# Patient Record
Sex: MALE | Race: WHITE | Employment: FULL TIME | URBAN - NONMETROPOLITAN AREA
[De-identification: names, ages, dates, MRNs, and addresses within clinical notes are randomized per-mention and may not be internally consistent; named-entity substitution may affect disease eponyms.]

---

## 2022-11-26 ENCOUNTER — HOSPITAL ENCOUNTER (EMERGENCY)
Age: 47
Discharge: HOME | End: 2022-11-26

## 2022-11-26 ENCOUNTER — APPOINTMENT (OUTPATIENT)
Dept: INTERVENTIONAL RADIOLOGY/VASCULAR | Age: 47
End: 2022-11-26

## 2022-11-26 ENCOUNTER — HOSPITAL ENCOUNTER (INPATIENT)
Age: 47
LOS: 1 days | Discharge: LEFT BEFORE DISCHARGE | DRG: 872 | End: 2022-11-27
Attending: STUDENT IN AN ORGANIZED HEALTH CARE EDUCATION/TRAINING PROGRAM | Admitting: STUDENT IN AN ORGANIZED HEALTH CARE EDUCATION/TRAINING PROGRAM

## 2022-11-26 ENCOUNTER — APPOINTMENT (OUTPATIENT)
Dept: CT IMAGING | Age: 47
DRG: 872 | End: 2022-11-26

## 2022-11-26 VITALS
RESPIRATION RATE: 18 BRPM | TEMPERATURE: 98.1 F | HEART RATE: 88 BPM | OXYGEN SATURATION: 98 % | DIASTOLIC BLOOD PRESSURE: 108 MMHG | SYSTOLIC BLOOD PRESSURE: 175 MMHG

## 2022-11-26 DIAGNOSIS — I70.209 OCCLUSION OF COMMON FEMORAL ARTERY (HCC): Primary | ICD-10-CM

## 2022-11-26 DIAGNOSIS — I70.201 OCCLUSION OF RIGHT FEMORAL ARTERY (HCC): ICD-10-CM

## 2022-11-26 DIAGNOSIS — L02.91 ABSCESS: Primary | ICD-10-CM

## 2022-11-26 DIAGNOSIS — L02.31 LEFT BUTTOCK ABSCESS: ICD-10-CM

## 2022-11-26 LAB
ALBUMIN SERPL-MCNC: 4.1 G/DL (ref 3.5–5.1)
ALBUMIN SERPL-MCNC: 4.2 G/DL (ref 3.5–5.1)
ALP BLD-CCNC: 105 U/L (ref 38–126)
ALP BLD-CCNC: 105 U/L (ref 38–126)
ALT SERPL-CCNC: 15 U/L (ref 11–66)
ALT SERPL-CCNC: 16 U/L (ref 11–66)
ANION GAP SERPL CALCULATED.3IONS-SCNC: 12 MEQ/L (ref 8–16)
ANION GAP SERPL CALCULATED.3IONS-SCNC: 15 MEQ/L (ref 8–16)
AST SERPL-CCNC: 20 U/L (ref 5–40)
AST SERPL-CCNC: 22 U/L (ref 5–40)
BASOPHILS # BLD: 0.3 %
BASOPHILS # BLD: 0.4 %
BASOPHILS ABSOLUTE: 0 THOU/MM3 (ref 0–0.1)
BASOPHILS ABSOLUTE: 0.1 THOU/MM3 (ref 0–0.1)
BILIRUB SERPL-MCNC: 0.4 MG/DL (ref 0.3–1.2)
BILIRUB SERPL-MCNC: 0.5 MG/DL (ref 0.3–1.2)
BUN BLDV-MCNC: 19 MG/DL (ref 7–22)
BUN BLDV-MCNC: 19 MG/DL (ref 7–22)
C-REACTIVE PROTEIN: 7.12 MG/DL (ref 0–1)
CALCIUM SERPL-MCNC: 9.6 MG/DL (ref 8.5–10.5)
CALCIUM SERPL-MCNC: 9.6 MG/DL (ref 8.5–10.5)
CHLORIDE BLD-SCNC: 100 MEQ/L (ref 98–111)
CHLORIDE BLD-SCNC: 98 MEQ/L (ref 98–111)
CO2: 23 MEQ/L (ref 23–33)
CO2: 26 MEQ/L (ref 23–33)
CREAT SERPL-MCNC: 0.7 MG/DL (ref 0.4–1.2)
CREAT SERPL-MCNC: 1 MG/DL (ref 0.4–1.2)
EOSINOPHIL # BLD: 1.6 %
EOSINOPHIL # BLD: 2 %
EOSINOPHILS ABSOLUTE: 0.2 THOU/MM3 (ref 0–0.4)
EOSINOPHILS ABSOLUTE: 0.3 THOU/MM3 (ref 0–0.4)
ERYTHROCYTE [DISTWIDTH] IN BLOOD BY AUTOMATED COUNT: 13.2 % (ref 11.5–14.5)
ERYTHROCYTE [DISTWIDTH] IN BLOOD BY AUTOMATED COUNT: 13.3 % (ref 11.5–14.5)
ERYTHROCYTE [DISTWIDTH] IN BLOOD BY AUTOMATED COUNT: 44.9 FL (ref 35–45)
ERYTHROCYTE [DISTWIDTH] IN BLOOD BY AUTOMATED COUNT: 46.2 FL (ref 35–45)
GFR SERPL CREATININE-BSD FRML MDRD: > 60 ML/MIN/1.73M2
GFR SERPL CREATININE-BSD FRML MDRD: > 60 ML/MIN/1.73M2
GLUCOSE BLD-MCNC: 76 MG/DL (ref 70–108)
GLUCOSE BLD-MCNC: 99 MG/DL (ref 70–108)
HCT VFR BLD CALC: 39.3 % (ref 42–52)
HCT VFR BLD CALC: 40.7 % (ref 42–52)
HEMOGLOBIN: 13.5 GM/DL (ref 14–18)
HEMOGLOBIN: 13.8 GM/DL (ref 14–18)
IMMATURE GRANS (ABS): 0.04 THOU/MM3 (ref 0–0.07)
IMMATURE GRANS (ABS): 0.04 THOU/MM3 (ref 0–0.07)
IMMATURE GRANULOCYTES: 0.3 %
IMMATURE GRANULOCYTES: 0.3 %
LACTIC ACID: 0.8 MMOL/L (ref 0.5–2)
LYMPHOCYTES # BLD: 12.2 %
LYMPHOCYTES # BLD: 12.6 %
LYMPHOCYTES ABSOLUTE: 1.4 THOU/MM3 (ref 1–4.8)
LYMPHOCYTES ABSOLUTE: 1.8 THOU/MM3 (ref 1–4.8)
MCH RBC QN AUTO: 31.9 PG (ref 26–33)
MCH RBC QN AUTO: 32 PG (ref 26–33)
MCHC RBC AUTO-ENTMCNC: 33.9 GM/DL (ref 32.2–35.5)
MCHC RBC AUTO-ENTMCNC: 34.4 GM/DL (ref 32.2–35.5)
MCV RBC AUTO: 93.1 FL (ref 80–94)
MCV RBC AUTO: 94 FL (ref 80–94)
MONOCYTES # BLD: 8 %
MONOCYTES # BLD: 8.6 %
MONOCYTES ABSOLUTE: 0.9 THOU/MM3 (ref 0.4–1.3)
MONOCYTES ABSOLUTE: 1.2 THOU/MM3 (ref 0.4–1.3)
NUCLEATED RED BLOOD CELLS: 0 /100 WBC
NUCLEATED RED BLOOD CELLS: 0 /100 WBC
OSMOLALITY CALCULATION: 274.2 MOSMOL/KG (ref 275–300)
PLATELET # BLD: 319 THOU/MM3 (ref 130–400)
PLATELET # BLD: 328 THOU/MM3 (ref 130–400)
PMV BLD AUTO: 9.3 FL (ref 9.4–12.4)
PMV BLD AUTO: 9.5 FL (ref 9.4–12.4)
POTASSIUM REFLEX MAGNESIUM: 4.2 MEQ/L (ref 3.5–5.2)
POTASSIUM SERPL-SCNC: 4.4 MEQ/L (ref 3.5–5.2)
PROCALCITONIN: 0.07 NG/ML (ref 0.01–0.09)
RBC # BLD: 4.22 MILL/MM3 (ref 4.7–6.1)
RBC # BLD: 4.33 MILL/MM3 (ref 4.7–6.1)
SEG NEUTROPHILS: 76.1 %
SEG NEUTROPHILS: 77.6 %
SEGMENTED NEUTROPHILS ABSOLUTE COUNT: 10.6 THOU/MM3 (ref 1.8–7.7)
SEGMENTED NEUTROPHILS ABSOLUTE COUNT: 9 THOU/MM3 (ref 1.8–7.7)
SODIUM BLD-SCNC: 136 MEQ/L (ref 135–145)
SODIUM BLD-SCNC: 138 MEQ/L (ref 135–145)
TOTAL PROTEIN: 6.8 G/DL (ref 6.1–8)
TOTAL PROTEIN: 7 G/DL (ref 6.1–8)
WBC # BLD: 11.6 THOU/MM3 (ref 4.8–10.8)
WBC # BLD: 13.9 THOU/MM3 (ref 4.8–10.8)

## 2022-11-26 PROCEDURE — 96367 TX/PROPH/DG ADDL SEQ IV INF: CPT

## 2022-11-26 PROCEDURE — 84145 PROCALCITONIN (PCT): CPT

## 2022-11-26 PROCEDURE — 85025 COMPLETE CBC W/AUTO DIFF WBC: CPT

## 2022-11-26 PROCEDURE — 99285 EMERGENCY DEPT VISIT HI MDM: CPT

## 2022-11-26 PROCEDURE — 80053 COMPREHEN METABOLIC PANEL: CPT

## 2022-11-26 PROCEDURE — 96365 THER/PROPH/DIAG IV INF INIT: CPT

## 2022-11-26 PROCEDURE — 75635 CT ANGIO ABDOMINAL ARTERIES: CPT

## 2022-11-26 PROCEDURE — 96375 TX/PRO/DX INJ NEW DRUG ADDON: CPT

## 2022-11-26 PROCEDURE — 93926 LOWER EXTREMITY STUDY: CPT

## 2022-11-26 PROCEDURE — 6360000004 HC RX CONTRAST MEDICATION: Performed by: STUDENT IN AN ORGANIZED HEALTH CARE EDUCATION/TRAINING PROGRAM

## 2022-11-26 PROCEDURE — 83605 ASSAY OF LACTIC ACID: CPT

## 2022-11-26 PROCEDURE — 36415 COLL VENOUS BLD VENIPUNCTURE: CPT

## 2022-11-26 PROCEDURE — 99284 EMERGENCY DEPT VISIT MOD MDM: CPT

## 2022-11-26 PROCEDURE — 6370000000 HC RX 637 (ALT 250 FOR IP): Performed by: PHYSICIAN ASSISTANT

## 2022-11-26 PROCEDURE — 86140 C-REACTIVE PROTEIN: CPT

## 2022-11-26 RX ORDER — OXYCODONE HYDROCHLORIDE AND ACETAMINOPHEN 5; 325 MG/1; MG/1
1 TABLET ORAL ONCE
Status: COMPLETED | OUTPATIENT
Start: 2022-11-26 | End: 2022-11-26

## 2022-11-26 RX ADMIN — OXYCODONE AND ACETAMINOPHEN 1 TABLET: 5; 325 TABLET ORAL at 10:47

## 2022-11-26 RX ADMIN — IOPAMIDOL 80 ML: 755 INJECTION, SOLUTION INTRAVENOUS at 22:57

## 2022-11-26 ASSESSMENT — PAIN DESCRIPTION - FREQUENCY: FREQUENCY: INTERMITTENT

## 2022-11-26 ASSESSMENT — ENCOUNTER SYMPTOMS
TROUBLE SWALLOWING: 0
SHORTNESS OF BREATH: 0
EYE ITCHING: 0
COUGH: 0
EYE REDNESS: 0
ABDOMINAL DISTENTION: 0
NAUSEA: 0
CHEST TIGHTNESS: 0
DIARRHEA: 0
STRIDOR: 0
BLOOD IN STOOL: 0
ABDOMINAL PAIN: 0
SINUS PAIN: 0
SORE THROAT: 0
CHOKING: 0
BACK PAIN: 0
WHEEZING: 0
COLOR CHANGE: 1
BLOOD IN STOOL: 0
VOMITING: 0
EYE PAIN: 0
PHOTOPHOBIA: 0
ABDOMINAL PAIN: 0
CONSTIPATION: 0

## 2022-11-26 ASSESSMENT — PAIN DESCRIPTION - LOCATION: LOCATION: LEG;BUTTOCKS

## 2022-11-26 ASSESSMENT — PAIN SCALES - GENERAL
PAINLEVEL_OUTOF10: 8
PAINLEVEL_OUTOF10: 2
PAINLEVEL_OUTOF10: 8

## 2022-11-26 ASSESSMENT — PAIN - FUNCTIONAL ASSESSMENT
PAIN_FUNCTIONAL_ASSESSMENT: 0-10
PAIN_FUNCTIONAL_ASSESSMENT: 0-10

## 2022-11-26 ASSESSMENT — PAIN DESCRIPTION - ORIENTATION: ORIENTATION: RIGHT

## 2022-11-26 NOTE — ED TRIAGE NOTES
Pt to the ED with c/o left buttock abscess. Pt states this has been intermittent for several months but this time it is not going away.

## 2022-11-26 NOTE — ED NOTES
RN reassed patient. Pt states he needs to leave to go get tires. RN encouraged patient to stay. Pt states he will stay a little longer.       Ana Gallegos RN  11/26/22 0212

## 2022-11-26 NOTE — ED NOTES
This RN at bedside to round on pt. Pt appears to have eloped from the ED. Notified [place].       Ronaldo Dimas RN  11/26/22 9170

## 2022-11-26 NOTE — ED PROVIDER NOTES
[facility]  eMERGENCY dEPARTMENT eNCOUnter          CHIEF COMPLAINT       Chief Complaint   Patient presents with    Abscess       Nurses Notes reviewed and I agree except as noted inthe HPI. HISTORY OF PRESENT ILLNESS    Ralph Martin is a 49 y.o. male who presents to the Emergency Department for the evaluation of abscess. Patient states he has had a spot on his buttock that is been coming and going for the past several months. States it will typically last a couple of days and then he is able to get it to drain on his own using a hot shower. However, for the past week its been constant, increasing in size and more painful than previously. States he has not had any drainage from it but does notice that when he sits down, he gets some clear-white leakage which has been an ongoing problem. He also reports pain at the area with bowel movements. No fevers, chills, nausea, vomiting. He has had a couple of areas lanced in the past, always in the emergency department and has never had surgical intervention for these. Sister has history of similar areas and patient reports is of been present in the abdomen, neck, and buttocks in the past.  He has not tried anything for home treatment. He also notes that he has had issues with right leg pain and color change with ambulation for several months. States he has had times where the foot feels cool. He has a photo from 1 it was bad and would like this evaluated while he is here as he does not have a PCP. The HPI was provided by the patient. REVIEW OF SYSTEMS     Review of Systems   Constitutional:  Negative for chills and fever. Gastrointestinal:  Negative for abdominal pain and blood in stool. Musculoskeletal:  Positive for arthralgias. Skin:  Positive for color change and wound. All other systems reviewed and are negative. PAST MEDICAL HISTORY    has no past medical history on file.     SURGICAL HISTORY      has no past surgical history on file. CURRENT MEDICATIONS     There are no discharge medications for this patient. ALLERGIES     has No Known Allergies. FAMILY HISTORY     has no family status information on file. family history is not on file. SOCIAL HISTORY          PHYSICAL EXAM     INITIAL VITALS:  oral temperature is 98.1 °F (36.7 °C). His blood pressure is 175/108 (abnormal) and his pulse is 88. His respiration is 18 and oxygen saturation is 98%. Physical Exam  Vitals and nursing note reviewed. HENT:      Head: Normocephalic and atraumatic. Eyes:      Conjunctiva/sclera: Conjunctivae normal.   Cardiovascular:      Rate and Rhythm: Normal rate. Comments: Right DP/PT pulses not palpable. Pulmonary:      Effort: Pulmonary effort is normal. No respiratory distress. Abdominal:      Palpations: Abdomen is soft. Tenderness: There is no abdominal tenderness. Genitourinary:     Comments: There is an 8 x 9 area of erythema to the medial left buttock which approaches but does not appear to involve the anus. There is approximately 6 to 7 cm diameter area of induration associated with this with some central fluctuance and warmth/tenderness throughout. Musculoskeletal:         General: Normal range of motion. Cervical back: Normal range of motion. Comments: Right foot is very mildly cool without any evidence of cyanosis and with capillary refill less than 3 seconds. Sensation is intact throughout as is range of motion. No tenderness. Patient has steady gait on ambulation. Skin:     General: Skin is warm and dry. Neurological:      Mental Status: He is alert.          DIFFERENTIAL DIAGNOSIS:   Differential diagnoses are discussed    DIAGNOSTIC RESULTS     EKG: All EKG's are interpreted by the Emergency Department Physician who either signs or Co-signsthis chart in the absence of a cardiologist.          RADIOLOGY: non-plain film images(s) such as CT, Ultrasound and MRI are read by retrograde flow of the profundal artery supplying the distal arterial system. I tried contacting the patient twice at [address] but there was no answer and voicemail was not set up. I did call his emergency contact, Susanne Willis, who was able to get in touch with him in the patient called back and expressed that he was going to come back in for further evaluation. Oncoming provider notified of the above results and plan. CRITICAL CARE:   None    CONSULTS:  None    PROCEDURES:  None    FINAL IMPRESSION      1. Occlusion of common femoral artery ([address]    2. Left buttock abscess          DISPOSITION/PLAN   Patient eloped    PATIENT REFERRED TO:  No follow-up provider specified. DISCHARGEMEDICATIONS:  There are no discharge medications for this patient.       (Please note that portions of this note were completedwith a voice recognition program.  Efforts were made to edit the dictations but occasionally words are mis-transcribed.)       Kika Irizarry PA-C  11/26/22 4028

## 2022-11-27 VITALS
BODY MASS INDEX: 16.97 KG/M2 | HEART RATE: 108 BPM | WEIGHT: 146.7 LBS | SYSTOLIC BLOOD PRESSURE: 142 MMHG | HEIGHT: 78 IN | OXYGEN SATURATION: 98 % | DIASTOLIC BLOOD PRESSURE: 93 MMHG | TEMPERATURE: 98.7 F | RESPIRATION RATE: 20 BRPM

## 2022-11-27 PROBLEM — L02.91 ABSCESS: Status: ACTIVE | Noted: 2022-11-27

## 2022-11-27 LAB
ANION GAP SERPL CALCULATED.3IONS-SCNC: 13 MEQ/L (ref 8–16)
APTT: 28.9 SECONDS (ref 22–38)
AVERAGE GLUCOSE: 108 MG/DL (ref 70–126)
BASOPHILS # BLD: 0.4 %
BASOPHILS ABSOLUTE: 0.1 THOU/MM3 (ref 0–0.1)
BUN BLDV-MCNC: 19 MG/DL (ref 7–22)
C3 COMPLEMENT: 148 MG/DL (ref 90–180)
CALCIUM SERPL-MCNC: 9 MG/DL (ref 8.5–10.5)
CHLORIDE BLD-SCNC: 100 MEQ/L (ref 98–111)
CO2: 23 MEQ/L (ref 23–33)
COMPLEMENT C4: 24 MG/DL (ref 10–40)
CREAT SERPL-MCNC: 0.8 MG/DL (ref 0.4–1.2)
EKG ATRIAL RATE: 93 BPM
EKG P AXIS: 86 DEGREES
EKG P-R INTERVAL: 150 MS
EKG Q-T INTERVAL: 336 MS
EKG QRS DURATION: 88 MS
EKG QTC CALCULATION (BAZETT): 417 MS
EKG R AXIS: -64 DEGREES
EKG T AXIS: 79 DEGREES
EKG VENTRICULAR RATE: 93 BPM
EOSINOPHIL # BLD: 2.4 %
EOSINOPHILS ABSOLUTE: 0.3 THOU/MM3 (ref 0–0.4)
ERYTHROCYTE [DISTWIDTH] IN BLOOD BY AUTOMATED COUNT: 13.2 % (ref 11.5–14.5)
ERYTHROCYTE [DISTWIDTH] IN BLOOD BY AUTOMATED COUNT: 45.8 FL (ref 35–45)
GFR SERPL CREATININE-BSD FRML MDRD: > 60 ML/MIN/1.73M2
GLUCOSE BLD-MCNC: 102 MG/DL (ref 70–108)
HBA1C MFR BLD: 5.6 % (ref 4.4–6.4)
HCT VFR BLD CALC: 37.9 % (ref 42–52)
HEMOGLOBIN: 13 GM/DL (ref 14–18)
IMMATURE GRANS (ABS): 0.05 THOU/MM3 (ref 0–0.07)
IMMATURE GRANULOCYTES: 0.4 %
INR BLD: 1.1 (ref 0.85–1.13)
LACTIC ACID: 1.1 MMOL/L (ref 0.5–2)
LYMPHOCYTES # BLD: 14.7 %
LYMPHOCYTES ABSOLUTE: 1.9 THOU/MM3 (ref 1–4.8)
MCH RBC QN AUTO: 32.5 PG (ref 26–33)
MCHC RBC AUTO-ENTMCNC: 34.3 GM/DL (ref 32.2–35.5)
MCV RBC AUTO: 94.8 FL (ref 80–94)
MONOCYTES # BLD: 9.1 %
MONOCYTES ABSOLUTE: 1.2 THOU/MM3 (ref 0.4–1.3)
NUCLEATED RED BLOOD CELLS: 0 /100 WBC
PLATELET # BLD: 320 THOU/MM3 (ref 130–400)
PMV BLD AUTO: 9.6 FL (ref 9.4–12.4)
POTASSIUM SERPL-SCNC: 3.8 MEQ/L (ref 3.5–5.2)
PROCALCITONIN: 0.11 NG/ML (ref 0.01–0.09)
PROCALCITONIN: 0.11 NG/ML (ref 0.01–0.09)
RBC # BLD: 4 MILL/MM3 (ref 4.7–6.1)
RHEUMATOID FACTOR: 12 IU/ML (ref 0–13)
SEG NEUTROPHILS: 73 %
SEGMENTED NEUTROPHILS ABSOLUTE COUNT: 9.6 THOU/MM3 (ref 1.8–7.7)
SODIUM BLD-SCNC: 136 MEQ/L (ref 135–145)
WBC # BLD: 13.1 THOU/MM3 (ref 4.8–10.8)

## 2022-11-27 PROCEDURE — 2580000003 HC RX 258: Performed by: EMERGENCY MEDICINE

## 2022-11-27 PROCEDURE — 85025 COMPLETE CBC W/AUTO DIFF WBC: CPT

## 2022-11-27 PROCEDURE — 93005 ELECTROCARDIOGRAM TRACING: CPT | Performed by: STUDENT IN AN ORGANIZED HEALTH CARE EDUCATION/TRAINING PROGRAM

## 2022-11-27 PROCEDURE — 87075 CULTR BACTERIA EXCEPT BLOOD: CPT

## 2022-11-27 PROCEDURE — 93010 ELECTROCARDIOGRAM REPORT: CPT | Performed by: INTERNAL MEDICINE

## 2022-11-27 PROCEDURE — 83605 ASSAY OF LACTIC ACID: CPT

## 2022-11-27 PROCEDURE — 36415 COLL VENOUS BLD VENIPUNCTURE: CPT

## 2022-11-27 PROCEDURE — 86038 ANTINUCLEAR ANTIBODIES: CPT

## 2022-11-27 PROCEDURE — 84145 PROCALCITONIN (PCT): CPT

## 2022-11-27 PROCEDURE — 87070 CULTURE OTHR SPECIMN AEROBIC: CPT

## 2022-11-27 PROCEDURE — 87077 CULTURE AEROBIC IDENTIFY: CPT

## 2022-11-27 PROCEDURE — 96365 THER/PROPH/DIAG IV INF INIT: CPT

## 2022-11-27 PROCEDURE — 87040 BLOOD CULTURE FOR BACTERIA: CPT

## 2022-11-27 PROCEDURE — 85610 PROTHROMBIN TIME: CPT

## 2022-11-27 PROCEDURE — 2500000003 HC RX 250 WO HCPCS: Performed by: EMERGENCY MEDICINE

## 2022-11-27 PROCEDURE — 99223 1ST HOSP IP/OBS HIGH 75: CPT | Performed by: STUDENT IN AN ORGANIZED HEALTH CARE EDUCATION/TRAINING PROGRAM

## 2022-11-27 PROCEDURE — 86430 RHEUMATOID FACTOR TEST QUAL: CPT

## 2022-11-27 PROCEDURE — 1200000000 HC SEMI PRIVATE

## 2022-11-27 PROCEDURE — 80048 BASIC METABOLIC PNL TOTAL CA: CPT

## 2022-11-27 PROCEDURE — 6360000002 HC RX W HCPCS: Performed by: EMERGENCY MEDICINE

## 2022-11-27 PROCEDURE — 86255 FLUORESCENT ANTIBODY SCREEN: CPT

## 2022-11-27 PROCEDURE — 83036 HEMOGLOBIN GLYCOSYLATED A1C: CPT

## 2022-11-27 PROCEDURE — 96367 TX/PROPH/DG ADDL SEQ IV INF: CPT

## 2022-11-27 PROCEDURE — 86160 COMPLEMENT ANTIGEN: CPT

## 2022-11-27 PROCEDURE — 85730 THROMBOPLASTIN TIME PARTIAL: CPT

## 2022-11-27 PROCEDURE — 87205 SMEAR GRAM STAIN: CPT

## 2022-11-27 PROCEDURE — 6360000002 HC RX W HCPCS

## 2022-11-27 PROCEDURE — 96375 TX/PRO/DX INJ NEW DRUG ADDON: CPT

## 2022-11-27 PROCEDURE — 2580000003 HC RX 258: Performed by: STUDENT IN AN ORGANIZED HEALTH CARE EDUCATION/TRAINING PROGRAM

## 2022-11-27 RX ORDER — FENTANYL CITRATE 50 UG/ML
INJECTION, SOLUTION INTRAMUSCULAR; INTRAVENOUS
Status: COMPLETED
Start: 2022-11-27 | End: 2022-11-27

## 2022-11-27 RX ORDER — SODIUM CHLORIDE 0.9 % (FLUSH) 0.9 %
5-40 SYRINGE (ML) INJECTION EVERY 12 HOURS SCHEDULED
Status: DISCONTINUED | OUTPATIENT
Start: 2022-11-27 | End: 2022-11-27 | Stop reason: SDUPTHER

## 2022-11-27 RX ORDER — SODIUM CHLORIDE 0.9 % (FLUSH) 0.9 %
5-40 SYRINGE (ML) INJECTION PRN
Status: DISCONTINUED | OUTPATIENT
Start: 2022-11-27 | End: 2022-11-27 | Stop reason: HOSPADM

## 2022-11-27 RX ORDER — HEPARIN SODIUM 10000 [USP'U]/100ML
5-30 INJECTION, SOLUTION INTRAVENOUS CONTINUOUS
Status: DISCONTINUED | OUTPATIENT
Start: 2022-11-27 | End: 2022-11-27 | Stop reason: HOSPADM

## 2022-11-27 RX ORDER — ACETAMINOPHEN 650 MG/1
650 SUPPOSITORY RECTAL EVERY 6 HOURS PRN
Status: DISCONTINUED | OUTPATIENT
Start: 2022-11-27 | End: 2022-11-27 | Stop reason: HOSPADM

## 2022-11-27 RX ORDER — FENTANYL CITRATE 50 UG/ML
100 INJECTION, SOLUTION INTRAMUSCULAR; INTRAVENOUS ONCE
Status: COMPLETED | OUTPATIENT
Start: 2022-11-27 | End: 2022-11-27

## 2022-11-27 RX ORDER — ONDANSETRON 2 MG/ML
4 INJECTION INTRAMUSCULAR; INTRAVENOUS EVERY 6 HOURS PRN
Status: DISCONTINUED | OUTPATIENT
Start: 2022-11-27 | End: 2022-11-27 | Stop reason: HOSPADM

## 2022-11-27 RX ORDER — HEPARIN SODIUM 1000 [USP'U]/ML
80 INJECTION, SOLUTION INTRAVENOUS; SUBCUTANEOUS PRN
Status: DISCONTINUED | OUTPATIENT
Start: 2022-11-27 | End: 2022-11-27 | Stop reason: HOSPADM

## 2022-11-27 RX ORDER — SODIUM CHLORIDE 9 MG/ML
INJECTION, SOLUTION INTRAVENOUS PRN
Status: DISCONTINUED | OUTPATIENT
Start: 2022-11-27 | End: 2022-11-27 | Stop reason: HOSPADM

## 2022-11-27 RX ORDER — 0.9 % SODIUM CHLORIDE 0.9 %
30 INTRAVENOUS SOLUTION INTRAVENOUS ONCE
Status: COMPLETED | OUTPATIENT
Start: 2022-11-27 | End: 2022-11-27

## 2022-11-27 RX ORDER — POLYETHYLENE GLYCOL 3350 17 G
2 POWDER IN PACKET (EA) ORAL
Status: DISCONTINUED | OUTPATIENT
Start: 2022-11-27 | End: 2022-11-27 | Stop reason: HOSPADM

## 2022-11-27 RX ORDER — HEPARIN SODIUM 1000 [USP'U]/ML
80 INJECTION, SOLUTION INTRAVENOUS; SUBCUTANEOUS ONCE
Status: COMPLETED | OUTPATIENT
Start: 2022-11-27 | End: 2022-11-27

## 2022-11-27 RX ORDER — ACETAMINOPHEN 325 MG/1
650 TABLET ORAL EVERY 6 HOURS PRN
Status: DISCONTINUED | OUTPATIENT
Start: 2022-11-27 | End: 2022-11-27 | Stop reason: HOSPADM

## 2022-11-27 RX ORDER — POLYETHYLENE GLYCOL 3350 17 G/17G
17 POWDER, FOR SOLUTION ORAL DAILY PRN
Status: DISCONTINUED | OUTPATIENT
Start: 2022-11-27 | End: 2022-11-27 | Stop reason: HOSPADM

## 2022-11-27 RX ORDER — NICOTINE 21 MG/24HR
1 PATCH, TRANSDERMAL 24 HOURS TRANSDERMAL DAILY
Status: DISCONTINUED | OUTPATIENT
Start: 2022-11-27 | End: 2022-11-27 | Stop reason: HOSPADM

## 2022-11-27 RX ORDER — SODIUM CHLORIDE 9 MG/ML
INJECTION, SOLUTION INTRAVENOUS PRN
Status: DISCONTINUED | OUTPATIENT
Start: 2022-11-27 | End: 2022-11-27 | Stop reason: SDUPTHER

## 2022-11-27 RX ORDER — HEPARIN SODIUM 1000 [USP'U]/ML
40 INJECTION, SOLUTION INTRAVENOUS; SUBCUTANEOUS PRN
Status: DISCONTINUED | OUTPATIENT
Start: 2022-11-27 | End: 2022-11-27 | Stop reason: HOSPADM

## 2022-11-27 RX ORDER — LIDOCAINE HYDROCHLORIDE 10 MG/ML
5 INJECTION, SOLUTION EPIDURAL; INFILTRATION; INTRACAUDAL; PERINEURAL ONCE
Status: COMPLETED | OUTPATIENT
Start: 2022-11-27 | End: 2022-11-27

## 2022-11-27 RX ORDER — ONDANSETRON 4 MG/1
4 TABLET, ORALLY DISINTEGRATING ORAL EVERY 8 HOURS PRN
Status: DISCONTINUED | OUTPATIENT
Start: 2022-11-27 | End: 2022-11-27 | Stop reason: HOSPADM

## 2022-11-27 RX ORDER — 0.9 % SODIUM CHLORIDE 0.9 %
30 INTRAVENOUS SOLUTION INTRAVENOUS ONCE
Status: DISCONTINUED | OUTPATIENT
Start: 2022-11-27 | End: 2022-11-27

## 2022-11-27 RX ORDER — SODIUM CHLORIDE 0.9 % (FLUSH) 0.9 %
5-40 SYRINGE (ML) INJECTION PRN
Status: DISCONTINUED | OUTPATIENT
Start: 2022-11-27 | End: 2022-11-27 | Stop reason: SDUPTHER

## 2022-11-27 RX ORDER — SODIUM CHLORIDE 0.9 % (FLUSH) 0.9 %
5-40 SYRINGE (ML) INJECTION EVERY 12 HOURS SCHEDULED
Status: DISCONTINUED | OUTPATIENT
Start: 2022-11-27 | End: 2022-11-27 | Stop reason: HOSPADM

## 2022-11-27 RX ADMIN — HEPARIN SODIUM 5180 UNITS: 1000 INJECTION, SOLUTION INTRAVENOUS; SUBCUTANEOUS at 04:27

## 2022-11-27 RX ADMIN — VANCOMYCIN HYDROCHLORIDE 1250 MG: 5 INJECTION, POWDER, LYOPHILIZED, FOR SOLUTION INTRAVENOUS at 03:36

## 2022-11-27 RX ADMIN — FENTANYL CITRATE 100 MCG: 50 INJECTION, SOLUTION INTRAMUSCULAR; INTRAVENOUS at 02:40

## 2022-11-27 RX ADMIN — FENTANYL CITRATE 100 MCG: 0.05 INJECTION, SOLUTION INTRAMUSCULAR; INTRAVENOUS at 02:40

## 2022-11-27 RX ADMIN — SODIUM CHLORIDE 1000 ML: 9 INJECTION, SOLUTION INTRAVENOUS at 05:39

## 2022-11-27 RX ADMIN — LIDOCAINE HYDROCHLORIDE 5 ML: 10 INJECTION, SOLUTION EPIDURAL; INFILTRATION; INTRACAUDAL; PERINEURAL at 03:01

## 2022-11-27 RX ADMIN — CEFTRIAXONE SODIUM 1000 MG: 1 INJECTION, POWDER, FOR SOLUTION INTRAMUSCULAR; INTRAVENOUS at 03:02

## 2022-11-27 RX ADMIN — HEPARIN SODIUM 18 UNITS/KG/HR: 10000 INJECTION, SOLUTION INTRAVENOUS at 04:33

## 2022-11-27 NOTE — ED NOTES
ED to inpatient nurses report    Chief Complaint   Patient presents with    Abscess      Present to ED from Home  LOC: alert and orientated to name, place, date  Vital signs   Vitals:    11/27/22 0108 11/27/22 0137 11/27/22 0208 11/27/22 0308   BP: (!) 127/92 (!) 142/99 (!) 131/104 (!) 157/107   Pulse: (!) 102 (!) 101 (!) 102 100   Resp: 19 17 17 15   Temp:       TempSrc:       SpO2: 96% 96% 96% 97%   Weight:       Height:          Oxygen Baseline RA    Current needs required Pain control Bipap/Cpap No  LDAs:   Peripheral IV 11/26/22 Right Forearm (Active)   Site Assessment Clean, dry & intact 11/26/22 2253   Line Status Blood return noted; Flushed 11/26/22 2253   Phlebitis Assessment No symptoms 11/26/22 2253   Infiltration Assessment 0 11/26/22 2253   Dressing Status Clean, dry & intact 11/26/22 2253   Dressing Type Transparent 11/26/22 2253       Peripheral IV 11/27/22 Left Forearm (Active)   Site Assessment Clean, dry & intact 11/27/22 0352   Line Status Blood return noted;Specimen collected; Flushed 11/27/22 0352   Phlebitis Assessment No symptoms 11/27/22 0352   Infiltration Assessment 0 11/27/22 0352   Dressing Status New dressing applied 11/27/22 0352   Dressing Type Transparent 11/27/22 0352     Mobility: Independent  Pending ED orders: No  Present condition: Stable    C-SSRS Risk of Suicide: No Risk  Swallow Screening    Preferred Language: [place]     Electronically signed by Martha Jiménez RN on 11/27/2022 at James Crawford RN  11/27/22 9308

## 2022-11-27 NOTE — ED TRIAGE NOTES
Pt states he is coming back after leaving because the hospital took to long. He stated that his father called him to tell him that he had some abnormal test and needed to be admitted. Pt complains of a abscess on his left buttocks and pain down his right leg. Pt stated he has had this pain for over two years. Pt admits to using Meth earlier and mariajuana. Pt has an abscess that is the size of a softball on left buttocks, red, painful and warm to the touch.

## 2022-11-27 NOTE — PROGRESS NOTES
[address] [facility] Pharmacokinetic Monitoring Service - Vancomycin     Luis Eduardo Crenshaw is a 49 y.o. male starting on vancomycin therapy for SSTI. Pharmacy consulted by Dr Darryl Nelson for monitoring and adjustment. Target Concentration: Goal trough of 10-15 mg/L and AUC/GIA <500 mg*hr/L    Additional Antimicrobials: none    Pertinent Laboratory Values: Wt Readings from Last 1 Encounters:   11/27/22 146 lb 11.2 oz (66.5 kg)     Temp Readings from Last 1 Encounters:   11/27/22 98.7 °F (37.1 °C) (Oral)     Estimated Creatinine Clearance: 85 mL/min (based on SCr of 1 mg/dL). Recent Labs     11/26/22  0952 11/26/22  2215   CREATININE 0.7 1.0   WBC 11.6* 13.9*     Procalcitonin: 0.07    Pertinent Cultures:  Culture Date Source Results   11/27/22 Blood x 2    11/27/22 Abscess culture    MRSA Nasal Swab: N/A. Non-respiratory infection.     Plan:  Dosing recommendations based on Bayesian software  Start vancomycin 1250mg x 1 in ED at 0336, then 750mg q12h  Anticipated AUC of 433 and trough concentration of 13.6 at steady state  Renal labs as indicated   Vancomycin concentration not ordered yet   Pharmacy will continue to monitor patient and adjust therapy as indicated    Thank you for the consult,  IRAIS Patel [facility]  11/27/2022 5:25 AM

## 2022-11-27 NOTE — ED NOTES
Per Dr. Juan José Rosa.  Pt to have heparin started on [address] [facility], [address] [place] [address]  11/27/22 8949

## 2022-11-27 NOTE — PLAN OF CARE
Problem: ABCDS Injury Assessment  Goal: Absence of physical injury  Outcome: Progressing     Problem: Safety - Adult  Goal: Free from fall injury  Outcome: Progressing     Problem: Risk for Elopement  Goal: Patient will not exit the unit/facility without proper excort  Outcome: Progressing  Flowsheets (Taken 11/27/2022 0500)  Nursing Interventions for Elopement Risk:   Communicate/escalate to charge nurse the risk of elopement   Reduce environmental triggers     Problem: Pain  Goal: Verbalizes/displays adequate comfort level or baseline comfort level  Outcome: Progressing   Care plan reviewed with patient. Patient verbalizes understanding of the plan of care and contribute to goal setting.

## 2022-11-27 NOTE — ED NOTES
Pt transferred to [facility] room 020 nurse informed prior to bringing pt up.       Farshad Garsia  11/27/22 9748

## 2022-11-27 NOTE — ED PROVIDER NOTES
[address] [facility] [address] [address] EMERGENCY DEPT  ED Attending Physician Attestation     I performed a history and physical examination of the patient and discussed management with the Resident. I reviewed the Resident's note and agree with the documented findings and plan of care. Any areas of disagreement are noted on the chart. I was personally present for the key portions of any procedures and have documented in the chart those procedures where I was not present during the key portions. I have reviewed the emergency nurses triage note and agree with the chief complaint, past medical history, past surgical history, allergies, medications, social and family history as documented unless otherwise noted below. Buttock abscess plus lower extremity arterial occlusion with reconstitution. No signs or symptoms of limb ischemia on examination. Reconstitution noted on multiple imaging modalities. CT was negative for evidence of perirectal involvement of the abscess. I&D was performed at the bedside with large volume purulent drainage. Rocephin and vancomycin ordered. The presenting tachycardia was thought to be related to methamphetamine use. The patient was afebrile. The leukocytosis was later noted and the patient was started on broad-spectrum antibiotics. Vascular surgery was contacted with recommendation for initiation of heparin and admission for evaluation in the a.m. The patient agreed to admission.     Emeka Villegas MD,   Attending Emergency Physician       Emeka Villegas MD  11/27/22 6081

## 2022-11-27 NOTE — PROGRESS NOTES
6090 - patient sets off bed alarm with change of position. Patient is very agitated and demanding to go out to smoke. Updated Dr Lissett Brian the following statement \"Patient wants to leave. Does not want nicotine patch, wants to go out and smoke. If we won't let him smoke then he wants to remove the IVs and leave because he can't be here all day and not smoke, his words. \" Patient is cursing loudly due to not being able to leave his room. Awaiting response from Dr Lissett Brian or Roc Jose PA-C.    6783 - notified patient of Prema Nicely decision to not allow him to smoke. Patient states \"then I am leaving. \" This nurse explained the seriousness of patient leaving against medical advice and that it could possibly lead to the clot travelling to major organs and cause his death. Patient states \"I am aware of all that but I just got to go smoke. I will go somewhere else to get a second opinion. \" Removed both forearm Ivs and secured with tape. Patient ambulatory to elevator per self at this time. 6930 - notified Garettjannie Nicely of patient departure from floor. Notified supervisor as well.

## 2022-11-27 NOTE — ED NOTES
Pt transferred to [place] [address] room 020 nurse informed prior to bringing Pt up.       Arthur Kruger  11/27/22 2646

## 2022-11-27 NOTE — H&P
Hospitalist - History & Physical      Patient: Fredy Lawson    Unit/Bed:40/040A  YOB: 1975  MRN: [id number]   Acct: [de-identified]   PCP: No primary care provider on file. Date of Service: Pt seen/examined on 11/27/22  and Admitted to Inpatient with expected LOS greater than two midnights due to medical therapy. Chief Complaint:  Abscess and RLE pain. Assessment and Plan:-  Possible sepsis secondary to L buttock abscess: He met SIRS 2/4 criteria at ED with elevated WBC of 13.9 and tachycardia , which could be related to meth use, but considering his abscess, cannot rule out sepsis. Thus will treat with 30 cc/kg bolus. Blood cx sent. On vancomycin. Will follow up cultures from abscess sent by ED. Will check HgbA1c as he reports being prone to abscesses. CRP of 7.12. Concern for right limb ischemia: as his RLE becomes purple at times, with numbness/tingling, has resting claudication pain, unable to palpate DP and TP pulses. R foot is cold on palpation. Fortunately, even though his CT showed occlusion of multiple vessels especially complete occlusion of R common femoral artery, complete occlusion of R vertebral artery and tibioperoneal trunk. He had bilateral arterial collaterals in both calves (greater on the right side), so making it chronic process, he reports his symptoms have been ongoing since 2016. Given the degree of multi focal central and peripheral vascular stenosis and occlusions, without significant atherosclerotic disease, concerning for vasculitis/collagen vascular disease. He has marfanoid features (height of 6'7 and BMI of 16.10), making collagen vascular disease a possibility (consider obtaining TTE, especially if blood cx result positive). Vascular surgery were consulted by ED, started on heparin gtt per their recommendation. Will order autoimmune workup to assess for vasculitis as well (MEHRDAD, ANCA, C3 and C4). Serial neurovascular checks.    Tobacco use: nicotine patch and lozenges PRN. Polysubstance abuse (especially meth, denies IV drug use): will get Utox. SW consult and Addiction services consult for rehab placement. Denies alcohol use, but placed on CIWA in case. Also placed him on COWS. Did not order any medications, can be re-assessed based on scores. History Of Present Illness:    Mr. Yulia Hines is a 49 y.o. male with polysubstance abuse (except IV/heroine) who presents at request of his father for treatment of his abscess and RLE pain. He has been struggling with buttock mass on left that comes and goes for past several months. It will last couple of days and then he can drain it by himself by taking hot shower. He came in because since one week, it has been consistently present and increasing in size with more pain relatively. When he sits up, it makes it leak. No fever/chills. He has no pain with bm. No nausea/vomiting. His sister experiences similar issues where she had abscesses at abdomen, neck and buttocks. He also has been having R leg pain and color change upon waking for several months. It becomes purple in color. He has numbness/tingling at the top aspect of his right foot. He has to wear compression stockings while walking or he cant walk. R foot stays cold all the time. He has cramps from time to time. Cramps start from back of knee and goes down to foot. It goes on and on and very painful that he is unable to ambulate. He has been like this since 2016. In April, he kicked his right toe in sleep and broke his toe. He was at ER on 11/26 am for the same issue where he underwent vascular arterial ultrasound which showed occlusion of the distal R common femoral artery just proximal to bifurcation. Just beyond the bifurcation there is retrograde flow within the profunda femoris artery which appears to provide collateral flow to the superficial femoral artery proximally.  Patient eloped because things were taking to long and he wanted to do meth and smoke cigarettes. He re-presented at night as his father made him come to ER. CT was done at ED which showed occlusion of multiple vessels especially complete occlusion of R common femoral artery, complete occlusion of R vertebral artery and tibioperoneal trunk with bilateral arterial collaterals in both calves (greater on the right side). Given the degree of multi focal central and peripheral vascular stenosis and occlusions, without significant atherosclerotic disease, concerning for vasculitis/collagen vascular disease. Also noted to have left buttock abscess up to 1.9 cm and mild inguinal LAD. Otherwise, no chest pain, nausea, vomiting, SOB, fever, issues with bm or urination. He underwent I&D at ED for R buttock abscess. Vascular surgery were consulted given his CT findings, they recommended heparin gtt. Hospitalist Team were contacted for admission. Past Medical History:    No past medical history on file. Past Surgical History:    No past surgical history on file. Home Medications:   No current facility-administered medications on file prior to encounter. No current outpatient medications on file prior to encounter. Allergies:    Patient has no known allergies. Social History:    reports that he has been smoking cigarettes. He has a 50.00 pack-year smoking history. He uses smokeless tobacco. He reports current alcohol use. He reports current drug use. Drugs: Cocaine, Marijuana (Fransico Moots), and Methamphetamines (Crystal Meth). Family History:   No family history on file. Diet:  No diet orders on file    Review of systems:   Pertinent positives as noted in the HPI. All other systems reviewed and negative. PHYSICAL EXAM:  BP (!) 157/107   Pulse 100   Temp 98.1 °F (36.7 °C) (Oral)   Resp 15   Ht 6' 7\" (2.007 m)   Wt 142 lb 14.4 oz (64.8 kg)   SpO2 97%   BMI 16.10 kg/m²   General appearance: No apparent distress, appears stated age and cooperative.   HEENT: Normal cephalic, atraumatic without obvious deformity. Pupils equal, round, and reactive to light. Extra ocular muscles intact. Conjunctivae/corneas clear. Poor dentition  Neck: Supple, with full range of motion. No jugular venous distention. Trachea midline. Small nodules at back of neck, which was non-tender (chronic per patient). Respiratory:  Normal respiratory effort. Clear to auscultation, bilaterally without Rales/Wheezes/Rhonchi. Cardiovascular: Regular rate and rhythm with normal S1/S2 without murmurs, rubs or gallops. Abdomen: Soft, non-tender, non-distended with normal bowel sounds. Musculoskeletal:  No clubbing, cyanosis or edema bilaterally. Skin: Skin color, texture, turgor normal.  Erythema/warmth present at left buttock, dressing in place C/D/I. Initial picture of abscess prior to drainage is attached below. Neurologic:  Neurovascularly intact without any focal motor deficits. Cranial nerves: II-XII intact, grossly non-focal. Loss of sensation at dorsal aspect of right toe. Psychiatric: Alert and oriented. Capillary Refill: poor capillary refill on R toe. Peripheral Pulses: unable to appreciate DP and TP on R. R foot cold on palpation. Labs:   Recent Labs     11/26/22 0952 11/26/22 2215   WBC 11.6* 13.9*   HGB 13.8* 13.5*   HCT 40.7* 39.3*    328     Recent Labs     11/26/22 0952 11/26/22 2215    138   K 4.2 4.4   CL 98 100   CO2 26 23   BUN 19 19   CREATININE 0.7 1.0   CALCIUM 9.6 9.6     Recent Labs     11/26/22 0952 11/26/22 2215   AST 20 22   ALT 15 16   BILITOT 0.4 0.5   ALKPHOS 105 105     No results for input(s): INR in the last 72 hours. No results for input(s): Gillian Alexander in the last 72 hours. Urinalysis:    No results found for: Ward Castles, BACTERIA, RBCUA, BLOODU, SPECGRAV, GLUCOSEU    Radiology:   CTA ABDOMINAL AORTA W BILAT RUNOFF W WO CONTRAST   Final Result   Impression:   1.  Complete occlusion right common femoral artery, with distal reconstitution of flow just above the bifurcation. Extensive local    arterial collaterals. 2. Complete occlusion right vertebral artery and tibioperoneal trunk, with    distal reconstitution of flow in the anterior and posterior tibial and    peroneal arteries. Extensive arterial collaterals in the calf. 3. Focal moderate/severe stenosis left popliteal artery, with    collateralization of arterial flow. 4. Stenosis proximal left anterior tibial artery. 5. Bilateral 3 vessel runoff. Bilateral arterial collaterals in both    calves, greater on the right side. 6. Moderate short segment SMA stenosis, with associated poststenotic    dilatation. 7. Complete occlusion short segment proximal celiac artery, with distal    reconstitution of flow via collaterals. 8. Given the degree of multi-focal central and peripheral vascular    stenosis and occlusions, without significant atherosclerotic disease. Findings raise possibility of vasculitis/collagen vascular disease. 9. Left buttock abscess up to 1.9 cm. Mild left inguinal lymphadenopathy. This document has been electronically signed by: Mir Fish MD on    11/27/2022 12:06 AM      All CTs at this facility use dose modulation techniques and iterative    reconstructions, and/or weight-based dosing   when appropriate to reduce radiation to a low as reasonably achievable. 3D Post-processing was performed on this study. CTA ABDOMINAL AORTA W BILAT RUNOFF W WO CONTRAST    Result Date: 11/27/2022  ADDENDUM #1 Addendum for dictation error correction/clarification. - Erroneous sentence: \"Complete occlusion right vertebral artery and tibioperoneal trunk, with distal reconstitution of flow in the anterior and posterior tibial and peroneal arteries. \" - Corrected sentence: Complete occlusion right popliteal artery and tibioperoneal trunk, with distal reconstitution of flow in the anterior and posterior tibial and peroneal arteries.  This document has been electronically signed by: Jose C Egan MD on 11/27/2022 12:10 AM ADDENDUM #2 This report was discussed with Delmis Garcia rn on Nov 27, 2022 00:11:00 EST. This document has been electronically signed by: Chio Trinh on 11/27/2022 12:11 AM ORIGINAL REPORTCTA Runoff with contrast Indication: Right SFA occlusion. Technique: CTA Runoff with intravenous contrast. Coronal and sagittal reformations. Comparison: None Findings: Motion artifacts, lowering sensitivity. The abdominal aorta is normal in caliber, with trace atherosclerotic plaques. Short segment occlusion of the proximal celiac artery, with distal reconstitution of flow via collaterals. Short segment moderate stenosis proximal superior mesenteric artery, with poststenotic dilatation. Bilateral renal arteries are patent. Inferior mesenteric artery not visualized. Multiple right and left renal arteries, variant anatomy. The right and left common external, and internal iliac arteries are patent. Right external iliac artery is smaller compared to the contralateral side. Right lower extremity: Complete occlusion of the common femoral artery to just above the bifurcation. Right profunda femoral and superficial femoral artery are patent. Extensive arterial collaterals identified right hip and thigh regions. Complete occlusion of the popliteal artery. Tibial peroneal trunk is not well visualized and likely occluded. Distal reconstitution of flow in the anterior and posterior tibial and peroneal arteries. Extensive arterial collaterals identified in the calf. Left lower extremity: The common femoral, superficial femoral, and popliteal arteries are patent. The profunda femoral artery is patent. Focal moderate/severe stenosis popliteal artery. Collateralization arterial flow, with prominent arterial collaterals near the tibial peroneal trunk. Tibial peroneal trunk appears patent. Stenosis proximal anterior tibial artery.  The anterior and posterior tibial, and peroneal arteries are patent. Other findings: Partially imaged lung bases: No pleural effusion or focal consolidation. Mild respiratory motion artifacts. The liver, spleen, and pancreas are normal. Adrenal glands are normal. Normal caliber gallbladder. Both kidneys enhance symmetrically. No hydronephrosis bilaterally. Small cyst in the upper pole right kidney. The large and small bowel are nondilated. No bulky abdominal or pelvic lymphadenopathy. Subcentimeter and mildly enlarged left inguinal lymph nodes. Urinary bladder partially distended with a normal contour. Superficial left buttock rim-enhancing abscess up to 1.9 x 4 cm with associated inflammation of the surrounding fat. Bones: Spondylosis. Multilevel Schmorls nodes. Facet arthropathy. Impression: 1. Complete occlusion right common femoral artery, with distal reconstitution of flow just above the bifurcation. Extensive local arterial collaterals. 2. Complete occlusion right vertebral artery and tibioperoneal trunk, with distal reconstitution of flow in the anterior and posterior tibial and peroneal arteries. Extensive arterial collaterals in the calf. 3. Focal moderate/severe stenosis left popliteal artery, with collateralization of arterial flow. 4. Stenosis proximal left anterior tibial artery. 5. Bilateral 3 vessel runoff. Bilateral arterial collaterals in both calves, greater on the right side. 6. Moderate short segment SMA stenosis, with associated poststenotic dilatation. 7. Complete occlusion short segment proximal celiac artery, with distal reconstitution of flow via collaterals. 8. Given the degree of multi-focal central and peripheral vascular stenosis and occlusions, without significant atherosclerotic disease. Findings raise possibility of vasculitis/collagen vascular disease. 9. Left buttock abscess up to 1.9 cm. Mild left inguinal lymphadenopathy.  This document has been electronically signed by: Lillian Mendez MD on 11/27/2022 12:06 AM All CTs at this facility use dose modulation techniques and iterative reconstructions, and/or weight-based dosing when appropriate to reduce radiation to a low as reasonably achievable. 3D Post-processing was performed on this study. VL DUP LOWER EXTREMITY ARTERIES RIGHT    Result Date: 11/26/2022  PROCEDURE: Right lower extremity arterial duplex ultrasound examination CLINICAL INFORMATION: Pain, color change. COMPARISON: No prior study. TECHNIQUE:  Grayscale, color flow and spectral ultrasound images were obtained of the right lower extremity arterial vasculature. FINDINGS: The proximal visualized right common femoral artery appears patent with a peak systolic velocity 201 cm/s. However, the distal common femoral artery just proximal to the bifurcation appears occluded. Just beyond the bifurcation there is flow within the profunda femoris artery demonstrating a peak systolic velocity of 767 cm/s with retrograde flow noted. This appears to provide collateral flow to the superficial femoral artery proximally. The proximal right SFA demonstrates a peak systolic velocity of 228 cm/s. The mid and distal SFA demonstrates peak systolic velocity of 85 and 72 cm/s respectively with dampened waveforms. Of note, the distal SFA appears more superficial in location than expected. This could be normal variant versus collateral flow. There is absent flow within the proximal popliteal artery. However the distal popliteal artery demonstrates reconstitution of flow likely via collaterals with a peak systolic velocity of 17 cm/s with dampened waveforms. The right peroneal artery appears patent with dampened waveform and a peak systolic velocity of 84 cm/s. The right posterior tibial and anterior tibial arteries appear patent with peak systolic velocities measuring 21 cm/s in each of these runoff vessels  respectively.  RIGHT ARTERY (PSV cm/sec) CFA --------------->115 PSV cm/sec MID 89 PSV cm/sec DISTAL No flow visualized PROF ------------->217 PSV cm/sec RETROGRADE SFA PROX ------->195 PSV cm/sec SFA MID ---------->85 PSV cm/sec SFA DIST -------->72 PSV cm/sec POP A PROX --->-- PSV cm/sec POP A DIST ---->17 PSV cm/sec PTA --------------->21 PSV cm/sec PERONEAL ----->33 PSV cm/sec JOLENE ----------------> 21 PSV cm/sec DWAYNE RIGHT JOLENE----->0.52 PTA----->0.54     1. There is occlusion of the distal right common femoral artery just proximal to the bifurcation. Just beyond the bifurcation there is retrograde flow within the profunda femoris artery which appears to provide collateral flow to the superficial femoral artery proximally. 2. The mid and distal SFA demonstrates peak systolic velocity of 85 and 72 cm/s respectively with dampened waveforms. Of note, the distal SFA appears more superficial in location than expected. This could be normal variant course of the distal SFA versus  a collateral blood vessel. 3. There is absent flow within the proximal right popliteal artery. However the distal right popliteal artery demonstrates reconstitution of flow likely via collaterals with a peak systolic velocity of 17 cm/s with dampened waveforms. 4. Patent appearance of the runoff vessels. However, the wave forms appear dampened likely due to diminished inflow. 5. The right ankle brachial indices are moderately diminished measuring 0.54. **This report has been created using voice recognition software. It may contain minor errors which are inherent in voice recognition technology. ** Final report electronically signed by Dr. Priscilla Brooks on 11/26/2022 2:21 PM        EKG: NSR. Possible left atrial enlargement LAD.      Electronically signed by Luis Rodriguez MD on 11/27/2022 at 4:01 AM

## 2022-11-27 NOTE — ED PROVIDER NOTES
[facility] ENCOUNTER          Pt Name: Osmar Esparza  MRN: [id number]  [place] 1975  Date of evaluation: 11/26/2022  Treating Resident Physician: Darrick Boeck, MD  Supervising Physician: Gisele Sánchez MD    History obtained from chart review and the patient. CHIEF COMPLAINT       Chief Complaint   Patient presents with    Abscess           HISTORY OF PRESENT ILLNESS    HPI  Osmar Esparza is a 49 y.o. male with PMH of Polysubstance abuse who presents to the emergency department for evaluation of a large abscess on his left buttock. Patient was seen this morning for the same complaint with the addition of intermittent right leg \"duskiness\". Provider at the time ordered an arterial ultrasound which showed complete occlusion of the right common femoral just proximal to the bifurcation. Patient eloped because \"things were taking too long\" and he wanted to smoke cigarettes and meth. Patient admitted to smoking meth prior to returning to the ED denied any IV drug use or history of IV drug use. Patient still had same complaints denied any chest pain, nausea, vomiting, shortness of breath, fever, bowel changes, urinary changes. The patient has no other acute complaints at this time. REVIEW OF SYSTEMS   Review of Systems   Constitutional:  Negative for appetite change, chills, diaphoresis, fatigue, fever and unexpected weight change. HENT:  Negative for dental problem, drooling, ear discharge, ear pain, hearing loss, mouth sores, sinus pain, sneezing, sore throat and trouble swallowing. Eyes:  Negative for photophobia, pain, redness and itching. Respiratory:  Negative for cough, choking, chest tightness, shortness of breath, wheezing and stridor. Cardiovascular:  Negative for chest pain, palpitations and leg swelling.    Gastrointestinal:  Negative for abdominal distention, abdominal pain, blood in stool, constipation, diarrhea, nausea and vomiting. Endocrine: Negative for polydipsia, polyphagia and polyuria. Genitourinary:  Negative for difficulty urinating, dysuria, flank pain, genital sores, penile discharge, testicular pain and urgency. Musculoskeletal:  Negative for back pain, myalgias and neck pain. Skin:  Negative for pallor, rash and wound. Neurological:  Negative for dizziness, tremors, seizures, syncope, numbness and headaches. Psychiatric/Behavioral:  Negative for confusion, dysphoric mood, self-injury and suicidal ideas. The patient is not nervous/anxious and is not hyperactive. PAST MEDICAL AND SURGICAL HISTORY   No past medical history on file. No past surgical history on file. MEDICATIONS     Current Facility-Administered Medications:     vancomycin (VANCOCIN) 1250 mg in dextrose 5 % 250 mL IVPB, 1,250 mg, IntraVENous, Once, Darcy Hatfield MD, Last Rate: 166.7 mL/hr at 11/27/22 0336, 1,250 mg at 11/27/22 0336    heparin (porcine) injection 5,180 Units, 80 Units/kg, IntraVENous, Once, Darcy Hatfield MD    heparin (porcine) injection 5,180 Units, 80 Units/kg, IntraVENous, PRN, Falguni Montes De Oca MD    heparin (porcine) injection 2,590 Units, 40 Units/kg, IntraVENous, PRN, Darcy Hatfield MD    heparin 25,000 units in dextrose 5% 250 mL (premix) infusion, 5-30 Units/kg/hr, IntraVENous, Continuous, Darcy Hatfield MD  No current outpatient medications on file.       SOCIAL HISTORY     Social History     Social History Narrative    Not on file     Social History     Tobacco Use    Smoking status: Every Day     Packs/day: 2.00     Years: 25.00     Pack years: 50.00     Types: Cigarettes    Smokeless tobacco: Current   Vaping Use    Vaping Use: Every day   Substance Use Topics    Alcohol use: Yes     Comment: Quit 2021    Drug use: Yes     Types: Cocaine, Marijuana (Weed), Methamphetamines (Crystal Meth)     Comment: Daily, last use this morning (11/26/22)         ALLERGIES   No Known Allergies      FAMILY HISTORY   No family history on file. PREVIOUS RECORDS   Previous records reviewed: Patient was seen earlier today this morning seen by HAYDE Rosa. patient eloped from this visit. PHYSICAL EXAM     ED Triage Vitals [11/26/22 2026]   BP Temp Temp Source Heart Rate Resp SpO2 Height Weight   (!) 155/115 98.1 °F (36.7 °C) Oral (!) 104 19 99 % 6' 7\" (2.007 m) 142 lb 14.4 oz (64.8 kg)     Initial vital signs and nursing assessment reviewed and abnormal from tachycardia, hypertension . Body mass index is 16.1 kg/m². Pulsoximetry is normal per my interpretation. Additional Vital Signs:  Vitals:    11/27/22 0308   BP: (!) 157/107   Pulse: 100   Resp: 15   Temp:    SpO2: 97%       Physical Exam  Vitals and nursing note reviewed. Constitutional:       General: He is not in acute distress. Appearance: He is not ill-appearing, toxic-appearing or diaphoretic. HENT:      Head: Normocephalic and atraumatic. Right Ear: External ear normal.      Left Ear: External ear normal.      Nose: Nose normal. No congestion or rhinorrhea. Mouth/Throat:      Mouth: Mucous membranes are moist.      Pharynx: Oropharynx is clear. No oropharyngeal exudate or posterior oropharyngeal erythema. Eyes:      General: No scleral icterus. Right eye: No discharge. Left eye: No discharge. Extraocular Movements: Extraocular movements intact. Conjunctiva/sclera: Conjunctivae normal.      Pupils: Pupils are equal, round, and reactive to light. Cardiovascular:      Rate and Rhythm: Regular rhythm. Tachycardia present. Pulses: Normal pulses. Heart sounds: Normal heart sounds. No murmur heard. No gallop. Pulmonary:      Effort: Pulmonary effort is normal. No respiratory distress. Breath sounds: Normal breath sounds. No stridor. No wheezing, rhonchi or rales. Abdominal:      General: Bowel sounds are normal. There is no distension.       Palpations: Abdomen is soft. Tenderness: There is no abdominal tenderness. There is no right CVA tenderness, left CVA tenderness, guarding or rebound. Musculoskeletal:         General: No swelling, tenderness, deformity or signs of injury. Cervical back: Neck supple. No rigidity or tenderness. Right lower leg: No edema. Left lower leg: No edema. Legs:       Comments: Large abscess with pain   Lymphadenopathy:      Cervical: No cervical adenopathy. Skin:     General: Skin is warm and dry. Coloration: Skin is not jaundiced or pale. Findings: No bruising, erythema, lesion or rash. Neurological:      General: No focal deficit present. Mental Status: He is alert and oriented to person, place, and time. Mental status is at baseline. Psychiatric:         Mood and Affect: Mood normal.         Behavior: Behavior normal.         Thought Content: Thought content normal.         Judgment: Judgment normal.           MEDICAL DECISION MAKING   Initial Assessment:   67-year-old male come to the ED for evaluation of abscess and leg pain. Patient appeared very anxious and jittery on initial evaluation due to him using meth prior to coming to the ED. Patient admitted to the elopements because he wanted to use the drugs and smoke cigarettes but agreed that he would stay for the treatment this time. Initially did not consider sepsis as a diagnosis because despite the tachycardia, hypertension patient was afebrile and these vital signs could be explained by him using meth prior to coming to the ED. Patient's abscess was quite large. Discussed I&D with the patient and since patient was complaining of mucus leakage from his rectum CT abdomen was ordered to rule out any tracking into the rectum. CT confirmed no tracking into the rectum and show that the abscess to be superficial.  Abscess was incised and drained and Primrose loop was placed with wound then bandaged.   Patient was started on vancomycin and Rocephin due to possible MRSA. After consult with Dr. Manuela Ro in vascular surgery in regards to the complete femoral artery obstruction and he advised that as long as the patient has distal pulses and flow in his right lower extremity he will see the patient in the morning. He advised to start the patient on heparin and admit them and add them to his service. Case discussed with inpatient hospitalist and patient was admitted. Plan:   CBC, CMP, blood culture x2, lactate, culture abscess  Consult vascular surgery  CTA abdomen pelvis with runoff  I&D abscess  Antibiotics, heparin  Admit        ED RESULTS   Laboratory results:  Labs Reviewed   CBC WITH AUTO DIFFERENTIAL - Abnormal; Notable for the following components:       Result Value    WBC 13.9 (*)     RBC 4.22 (*)     Hemoglobin 13.5 (*)     Hematocrit 39.3 (*)     Segs Absolute 10.6 (*)     All other components within normal limits   CULTURE, BLOOD 1   CULTURE, BLOOD 1   CULTURE, ANAEROBIC AND AEROBIC   COMPREHENSIVE METABOLIC PANEL   GLOMERULAR FILTRATION RATE, ESTIMATED   ANION GAP   LACTIC ACID   APTT   PROTIME-INR   ANTI-XA, UNFRACTIONATED HEPARIN   ANTI-XA, UNFRACTIONATED HEPARIN       Radiologic studies results:  CTA ABDOMINAL AORTA W BILAT RUNOFF W WO CONTRAST   Final Result   Impression:   1. Complete occlusion right common femoral artery, with distal    reconstitution of flow just above the bifurcation. Extensive local    arterial collaterals. 2. Complete occlusion right vertebral artery and tibioperoneal trunk, with    distal reconstitution of flow in the anterior and posterior tibial and    peroneal arteries. Extensive arterial collaterals in the calf. 3. Focal moderate/severe stenosis left popliteal artery, with    collateralization of arterial flow. 4. Stenosis proximal left anterior tibial artery. 5. Bilateral 3 vessel runoff. Bilateral arterial collaterals in both    calves, greater on the right side.    6. Moderate short segment SMA stenosis, with associated poststenotic    dilatation. 7. Complete occlusion short segment proximal celiac artery, with distal    reconstitution of flow via collaterals. 8. Given the degree of multi-focal central and peripheral vascular    stenosis and occlusions, without significant atherosclerotic disease. Findings raise possibility of vasculitis/collagen vascular disease. 9. Left buttock abscess up to 1.9 cm. Mild left inguinal lymphadenopathy. This document has been electronically signed by: Manuel Ambrosio MD on    11/27/2022 12:06 AM      All CTs at this facility use dose modulation techniques and iterative    reconstructions, and/or weight-based dosing   when appropriate to reduce radiation to a low as reasonably achievable. 3D Post-processing was performed on this study. ED Medications administered this visit:   Medications   vancomycin (VANCOCIN) 1250 mg in dextrose 5 % 250 mL IVPB (1,250 mg IntraVENous New Bag 11/27/22 0336)   heparin (porcine) injection 5,180 Units (has no administration in time range)   heparin (porcine) injection 5,180 Units (has no administration in time range)   heparin (porcine) injection 2,590 Units (has no administration in time range)   heparin 25,000 units in dextrose 5% 250 mL (premix) infusion (has no administration in time range)   iopamidol (ISOVUE-370) 76 % injection 80 mL (80 mLs IntraVENous Given 11/26/22 2257)   lidocaine PF 1 % injection 5 mL (5 mLs IntraDERmal Given 11/27/22 0301)   cefTRIAXone (ROCEPHIN) 1,000 mg in dextrose 5 % 50 mL IVPB mini-bag (0 mg IntraVENous Stopped 11/27/22 0332)   fentaNYL (SUBLIMAZE) injection 100 mcg (100 mcg IntraVENous Given 11/27/22 0240)         ED COURSE     ED Course as of 11/27/22 0345   Sat Nov 26, 2022 2224 Spoke with Dr. Skyler Villarreal with vascular surgery who advised to obtain CTA runoffs, start heparin drip, and admit the patient and add him to his service and he will see him in the morning.  [facility] ED Course User Index  [AA] Max Souza MD       MEDICATION CHANGES     New Prescriptions    No medications on file         FINAL DISPOSITION     Final diagnoses:   Abscess   Occlusion of right femoral artery (HCC)     Condition: condition: fair  Dispo: Admit to hospitalist with vascular consult      This transcription was electronically signed. Parts of this transcriptions may have been dictated by use of voice recognition software and electronically transcribed, and parts may have been transcribed with the assistance of an ED scribe. The transcription may contain errors not detected in proofreading. Please refer to my supervising physician's documentation if my documentation differs.     Electronically Signed: Max Souza MD, 11/27/22, 3:45 Shaniqua Otero MD  Resident  11/27/22 0826

## 2022-11-30 LAB
AEROBIC CULTURE: ABNORMAL
ANA SCREEN: NORMAL
ANAEROBIC CULTURE: ABNORMAL
GRAM STAIN RESULT: ABNORMAL
ORGANISM: ABNORMAL

## 2022-12-01 LAB
ANCA IFA PATTERN: NORMAL
ANCA IFA TITER: NORMAL

## 2022-12-02 LAB
BLOOD CULTURE, ROUTINE: NORMAL
BLOOD CULTURE, ROUTINE: NORMAL

## 2023-04-07 ENCOUNTER — HOSPITAL ENCOUNTER (EMERGENCY)
Age: 48
Discharge: HOME | End: 2023-04-07

## 2023-04-07 ENCOUNTER — APPOINTMENT (OUTPATIENT)
Dept: GENERAL RADIOLOGY | Age: 48
End: 2023-04-07

## 2023-04-07 VITALS
TEMPERATURE: 98.1 F | RESPIRATION RATE: 16 BRPM | HEIGHT: 78 IN | OXYGEN SATURATION: 96 % | BODY MASS INDEX: 17.89 KG/M2 | SYSTOLIC BLOOD PRESSURE: 100 MMHG | DIASTOLIC BLOOD PRESSURE: 70 MMHG | HEART RATE: 73 BPM | WEIGHT: 154.6 LBS

## 2023-04-07 DIAGNOSIS — K59.00 CONSTIPATION, UNSPECIFIED CONSTIPATION TYPE: Primary | ICD-10-CM

## 2023-04-07 LAB
BILIRUB UR STRIP.AUTO-MCNC: ABNORMAL MG/DL
BUN BLD-MCNC: 31 MG/DL (ref 8–26)
CHARACTER UR: CLEAR
CHLORIDE BLD-SCNC: 106 MEQ/L (ref 98–109)
CO2 BLD CALC-SCNC: 23 MEQ/L (ref 23–33)
COLOR: YELLOW
CREAT BLD-MCNC: 1.4 MG/DL (ref 0.5–1.2)
ERYTHROCYTE [DISTWIDTH] IN BLOOD BY AUTOMATED COUNT: 14.1 % (ref 11.5–14.5)
GFR SERPL CREATININE-BSD FRML MDRD: > 60 ML/MIN/1.73M2
GLUCOSE BLD-MCNC: 97 MG/DL (ref 70–108)
GLUCOSE UR QL STRIP.AUTO: NEGATIVE MG/DL
HCT VFR BLD AUTO: 43.1 % (ref 42–52)
HGB BLD-MCNC: 14.6 GM/DL (ref 14–18)
KETONES UR QL STRIP.AUTO: ABNORMAL
MCH RBC QN AUTO: 32.4 PG (ref 27–31)
MCHC RBC AUTO-ENTMCNC: 33.9 GM/DL (ref 33–37)
MCV RBC AUTO: 96 FL (ref 80–94)
NITRITE UR QL STRIP.AUTO: NEGATIVE
PH UR STRIP.AUTO: 5.5 [PH] (ref 5–9)
PLATELET # BLD AUTO: 337 THOU/MM3 (ref 130–400)
PMV BLD AUTO: 9.8 FL (ref 7.4–10.4)
POTASSIUM BLD-SCNC: 4 MEQ/L (ref 3.5–4.9)
PROT UR STRIP.AUTO-MCNC: 30 MG/DL
RBC # BLD AUTO: 4.51 MILL/MM3 (ref 4.7–6.1)
RBC #/AREA URNS HPF: NEGATIVE /[HPF]
SODIUM BLD-SCNC: 142 MEQ/L (ref 138–146)
SP GR UR STRIP.AUTO: >= 1.03 (ref 1–1.03)
UROBILINOGEN, URINE: 0.2 EU/DL (ref 0.2–1)
WBC # BLD AUTO: 8.7 THOU/MM3 (ref 4.8–10.8)
WBC #/AREA URNS HPF: NEGATIVE /[HPF]

## 2023-04-07 PROCEDURE — 82565 ASSAY OF CREATININE: CPT

## 2023-04-07 PROCEDURE — 84520 ASSAY OF UREA NITROGEN: CPT

## 2023-04-07 PROCEDURE — 80051 ELECTROLYTE PANEL: CPT

## 2023-04-07 PROCEDURE — 99203 OFFICE O/P NEW LOW 30 MIN: CPT | Performed by: NURSE PRACTITIONER

## 2023-04-07 PROCEDURE — 82947 ASSAY GLUCOSE BLOOD QUANT: CPT

## 2023-04-07 PROCEDURE — 85027 COMPLETE CBC AUTOMATED: CPT

## 2023-04-07 PROCEDURE — 36415 COLL VENOUS BLD VENIPUNCTURE: CPT

## 2023-04-07 PROCEDURE — 74018 RADEX ABDOMEN 1 VIEW: CPT

## 2023-04-07 PROCEDURE — 81003 URINALYSIS AUTO W/O SCOPE: CPT

## 2023-04-07 RX ORDER — POLYETHYLENE GLYCOL 3350 17 G/17G
17 POWDER, FOR SOLUTION ORAL DAILY
Qty: 255 G | Refills: 0 | Status: SHIPPED | OUTPATIENT
Start: 2023-04-07 | End: 2023-04-22

## 2023-04-07 RX ORDER — LISINOPRIL 10 MG/1
10 TABLET ORAL DAILY
COMMUNITY

## 2023-04-07 RX ORDER — DOCUSATE SODIUM 100 MG/1
100 CAPSULE, LIQUID FILLED ORAL 2 TIMES DAILY
Qty: 20 CAPSULE | Refills: 0 | Status: SHIPPED | OUTPATIENT
Start: 2023-04-07 | End: 2023-04-17

## 2023-04-07 ASSESSMENT — PAIN DESCRIPTION - DESCRIPTORS: DESCRIPTORS: ACHING

## 2023-04-07 ASSESSMENT — PAIN DESCRIPTION - LOCATION: LOCATION: BACK

## 2023-04-07 ASSESSMENT — ENCOUNTER SYMPTOMS
DIARRHEA: 1
NAUSEA: 0
VOMITING: 0
BACK PAIN: 1
CONSTIPATION: 1
ABDOMINAL PAIN: 1
SHORTNESS OF BREATH: 0

## 2023-04-07 ASSESSMENT — PAIN DESCRIPTION - ORIENTATION: ORIENTATION: RIGHT;LEFT;INNER

## 2023-04-07 ASSESSMENT — PAIN - FUNCTIONAL ASSESSMENT: PAIN_FUNCTIONAL_ASSESSMENT: 0-10

## 2023-04-07 ASSESSMENT — PAIN DESCRIPTION - PAIN TYPE: TYPE: ACUTE PAIN;CHRONIC PAIN

## 2023-04-07 ASSESSMENT — PAIN SCALES - GENERAL: PAINLEVEL_OUTOF10: 9

## 2023-04-07 NOTE — ED PROVIDER NOTES
9.8 7.4 - 10.4 fL   Urinalysis   Result Value Ref Range    Glucose, Ur Negative NEGATIVE mg/dl    Bilirubin Urine Small (A) NEGATIVE    Ketones, Urine Trace (A) NEGATIVE    Specific Gravity, Urine >= 1.030 1.002 - 1.030    Blood, Urine Negative NEGATIVE    pH, UA 5.50 5.0 - 9.0    Protein, UA 30 (A) NEGATIVE mg/dl    Urobilinogen, Urine 0.20 0.2 - 1.0 eu/dl    Nitrite, Urine Negative NEGATIVE    Leukocyte Esterase, Urine Negative NEGATIVE    Color, UA Yellow STRAW-YELLOW    Character, Urine Clear CLEAR-SL CLOUD   POC Basic Metabol Panel without Calcium   Result Value Ref Range    Sodium 142 138 - 146 meq/l    Potassium, Whole Blood 4.0 3.5 - 4.9 meq/l    Chloride, Whole Blood 106 98 - 109 meq/l    TOTAL CO2, WHOLE BLOOD 23 23 - 33 meq/l    Glucose, Whole Blood 97 70 - 108 mg/dl    BUN, WHOLE BLOOD 31 (H) 8 - 26 mg/dl    Creatinine 1.4 (H) 0.5 - 1.2 mg/dl   Glomerular Filtration Rate, Estimated   Result Value Ref Range    Est, Glom Filt Rate >60 >60 ml/min/1.73m2       IMAGING:    XR ABDOMEN (KUB) (SINGLE AP VIEW)   Final Result   Nonspecific abdomen. .               **This report has been created using voice recognition software. It may contain minor errors which are inherent in voice recognition technology. **      Final report electronically signed by DR Daniel Felton on 4/7/2023 2:51 PM          I have independently reviewed the radiology images as well as the radiologist's report and have discussed them with the patient. EKG:  None    URGENT CARE COURSE:     Vitals:    04/07/23 1422   BP: 100/70   Pulse: 73   Resp: 16   Temp: 98.1 °F (36.7 °C)   TempSrc: Oral   SpO2: 96%   Weight: 154 lb 9.6 oz (70.1 kg)   Height: 6' 7\" (2.007 m)       Medications - No data to display       PROCEDURES:  None    FINAL IMPRESSION      1.  Constipation, unspecified constipation type      DISPOSITION/ PLAN   DISPOSITION Decision To Discharge 04/07/2023 03:28:09 PM     Patient presented with complaints of abdominal pain that have been

## 2023-04-07 NOTE — Clinical Note
Fredy Lawson was seen and treated in our emergency department on 4/7/2023. He may return to work on 04/09/2023. If you have any questions or concerns, please don't hesitate to call.       Yonathan Becerril, APRN - CNP

## 2023-04-07 NOTE — ED TRIAGE NOTES
Chronic back pain but back pain increased several days ago and is now 9/10 Pt states he needed to leave work due to pain. Pt also informed nurse he was treated 11/22 DVT right lower leg.

## 2023-04-07 NOTE — ED NOTES
Urine sample obtained and sent to lab. Urine is clear, dark yellow in color.       Marianna Elizabeth RN  04/07/23 8781